# Patient Record
Sex: FEMALE | Race: WHITE | NOT HISPANIC OR LATINO | Employment: FULL TIME | URBAN - METROPOLITAN AREA
[De-identification: names, ages, dates, MRNs, and addresses within clinical notes are randomized per-mention and may not be internally consistent; named-entity substitution may affect disease eponyms.]

---

## 2025-05-02 ENCOUNTER — APPOINTMENT (OUTPATIENT)
Facility: CLINIC | Age: 40
End: 2025-05-02

## 2025-05-02 VITALS — BODY MASS INDEX: 18.03 KG/M2 | HEIGHT: 62 IN | WEIGHT: 98 LBS | HEART RATE: 87 BPM

## 2025-05-02 DIAGNOSIS — R14.0 ABDOMINAL BLOATING: ICD-10-CM

## 2025-05-02 DIAGNOSIS — R12 HEARTBURN: ICD-10-CM

## 2025-05-02 DIAGNOSIS — R10.84 GENERALIZED ABDOMINAL PAIN: Primary | ICD-10-CM

## 2025-05-02 DIAGNOSIS — R10.84 GENERALIZED ABDOMINAL PAIN: ICD-10-CM

## 2025-05-02 PROCEDURE — 3008F BODY MASS INDEX DOCD: CPT | Performed by: INTERNAL MEDICINE

## 2025-05-02 PROCEDURE — 99205 OFFICE O/P NEW HI 60 MIN: CPT | Performed by: INTERNAL MEDICINE

## 2025-05-02 PROCEDURE — 1036F TOBACCO NON-USER: CPT | Performed by: INTERNAL MEDICINE

## 2025-05-02 RX ORDER — ESCITALOPRAM OXALATE 5 MG/1
5 TABLET ORAL
COMMUNITY
Start: 2025-02-20

## 2025-05-02 RX ORDER — HYDROGEN PEROXIDE 3 %
20 SOLUTION, NON-ORAL MISCELLANEOUS
Qty: 30 CAPSULE | Refills: 11 | Status: SHIPPED | OUTPATIENT
Start: 2025-05-02 | End: 2026-05-02

## 2025-05-02 RX ORDER — SODIUM FLUORIDE 1.1 G/100G
CREAM ORAL
COMMUNITY
Start: 2025-03-11

## 2025-05-02 NOTE — PROGRESS NOTES
Primary Care Provider: CAYETANO Null-CNP  Referring Provider: No ref. provider found  My final recommendations will be communicated back to the referring physician and/or primary care provider via shared medical records or via US mail.    Chief Complaint (Reason for visit):   Jennifer Pryor is a 39 y.o. female who presents for abdominal pain     ASSESSMENT AND PLAN     Assessment & Plan  Generalized abdominal pain  Patient has postprandial abdominal pain, which gets better after bowel movements.  She does not feel that she is constipated.     RF  She has history of gastric ulcers which has been attributed to NSAIDs.  She has stopped taking NSAIDs since  Family history of Crohn's disease in mother    Ultrasound gallbladder-normal    Broad differential diagnosis including PUD, gastroparesis, IBS    Patient states that she had endoscopy and colonoscopy in 2018-normal reportedly    - I will ask patient to take Nexium 20 mg/day  - I will ask her to take Colace even though she does not feel constipated, I suspect there may be some stool overburden  -I recommend an upper endoscopy to rule out H. pylori infection     []next steps  Gastric emptying test, CT scan, Cscope, followed by TCAs      Orders:    esomeprazole (NexIUM) 20 mg DR capsule; Take 1 capsule (20 mg) by mouth once daily. Do not open capsule.    Esophagogastroduodenoscopy (EGD); Future    Abdominal bloating    Orders:    esomeprazole (NexIUM) 20 mg DR capsule; Take 1 capsule (20 mg) by mouth once daily. Do not open capsule.    Esophagogastroduodenoscopy (EGD); Future    Heartburn    Orders:    esomeprazole (NexIUM) 20 mg DR capsule; Take 1 capsule (20 mg) by mouth once daily. Do not open capsule.    Esophagogastroduodenoscopy (EGD); Future    The total time spent on rendering services for this patient (obtaining/reviewing additional history, performing medically appropriate exam and/or evaluation, reviewing the electronic chart, labs, imaging,  "independently interpreting the results and communicating and discussing with medical teams, counseling and educating the patient, discussing with friend (present in room and authorized by patient), ordering medications, tests and/or procedures, coordinating care, and documentation) was 65 minutes.     I discussed the risks, benefits and alternative(s) of the procedure(s) with the patient. Pt verbalizes understanding and is willing to proceed. All questions were answered.    Felecia Brown MD, MS    SUBJECTIVE   HPI: History obtained from patient    49-year-old female who comes to see me for abdominal pain, bloating and distention after meals .      Patient states that she is okay when she wake up in the morning.  She has somewhere between 2-4 bowel movements in the morning after drinking coffee.  She feels great.      After eating lunch, she has abdominal pain and abdominal distention which lasts throughout the day.  She still is able to eat dinner but she is still feels taking distention and the pain.  Next morning when she has bowel movements she feels better again    She does not feel she is constipated.  She thinks her stool are soft.  She does not have to exert    No improvement with cutting out dairy, sugar, gluten    RF  She has history of gastric ulcers which has been attributed to NSAIDs.  She has stopped taking NSAIDs since  Family history of Crohn's disease in mother    Ultrasound gallbladder-normal  2018 EGD/Cscope  Has gastric ulcer  Stopped NSAIDs        Medical History[1]    Surgical History[2]    Current Medications[3]    Allergies[4]  OBJECTIVE   PHYSICAL EXAM:  Pulse 87   Ht 1.562 m (5' 1.5\")   Wt (!) 44.5 kg (98 lb)   BMI 18.22 kg/m²      LABS/IMAGING/SCOPES  No results found for: \"WBC\", \"HGB\", \"HCT\", \"MCV\", \"PLT\"  No results found for: \"GLUCOSE\", \"CALCIUM\", \"NA\", \"K\", \"CO2\", \"CL\", \"BUN\", \"CREATININE\"  No results found for: \"ALT\", \"AST\", \"GGT\", \"ALKPHOS\", \"BILITOT\"        Felecia Brown MD, " MS         [1] No past medical history on file.  [2] No past surgical history on file.  [3]   Current Outpatient Medications   Medication Sig Dispense Refill    Denta 5000 Plus 1.1 % dental cream use as directed      escitalopram (Lexapro) 5 mg tablet Take 1 tablet (5 mg) by mouth once daily.       No current facility-administered medications for this visit.   [4] Not on File

## 2025-05-05 DIAGNOSIS — R12 HEARTBURN: ICD-10-CM

## 2025-05-05 RX ORDER — OMEPRAZOLE 40 MG/1
40 CAPSULE, DELAYED RELEASE ORAL
Qty: 90 CAPSULE | Refills: 3 | Status: SHIPPED | OUTPATIENT
Start: 2025-05-05 | End: 2026-05-05

## 2025-05-05 RX ORDER — RABEPRAZOLE SODIUM 20 MG/1
20 TABLET, DELAYED RELEASE ORAL DAILY
Qty: 30 TABLET | Refills: 2 | Status: SHIPPED | OUTPATIENT
Start: 2025-05-05

## 2025-05-22 DIAGNOSIS — R14.0 ABDOMINAL BLOATING: ICD-10-CM

## 2025-05-22 DIAGNOSIS — R10.84 GENERALIZED ABDOMINAL PAIN: Primary | ICD-10-CM

## 2025-05-27 ENCOUNTER — APPOINTMENT (OUTPATIENT)
Dept: GASTROENTEROLOGY | Facility: EXTERNAL LOCATION | Age: 40
End: 2025-05-27
Payer: COMMERCIAL

## 2025-08-19 DIAGNOSIS — K63.8219 SMALL INTESTINAL BACTERIAL OVERGROWTH (SIBO): Primary | ICD-10-CM
